# Patient Record
Sex: MALE | Race: AMERICAN INDIAN OR ALASKA NATIVE
[De-identification: names, ages, dates, MRNs, and addresses within clinical notes are randomized per-mention and may not be internally consistent; named-entity substitution may affect disease eponyms.]

---

## 2018-08-29 ENCOUNTER — HOSPITAL ENCOUNTER (EMERGENCY)
Dept: HOSPITAL 42 - ED | Age: 43
Discharge: HOME | End: 2018-08-29
Payer: COMMERCIAL

## 2018-08-29 VITALS — RESPIRATION RATE: 18 BRPM | TEMPERATURE: 98.2 F

## 2018-08-29 VITALS — SYSTOLIC BLOOD PRESSURE: 135 MMHG | HEART RATE: 67 BPM | DIASTOLIC BLOOD PRESSURE: 82 MMHG | OXYGEN SATURATION: 97 %

## 2018-08-29 VITALS — BODY MASS INDEX: 36.5 KG/M2

## 2018-08-29 DIAGNOSIS — R00.2: Primary | ICD-10-CM

## 2018-08-29 DIAGNOSIS — I48.91: ICD-10-CM

## 2018-08-29 LAB
ALBUMIN SERPL-MCNC: 4.6 G/DL (ref 3–4.8)
ALBUMIN/GLOB SERPL: 1.4 {RATIO} (ref 1.1–1.8)
ALT SERPL-CCNC: 68 U/L (ref 7–56)
APPEARANCE UR: (no result)
APTT BLD: 34.3 SECONDS (ref 25.1–36.5)
AST SERPL-CCNC: 44 U/L (ref 17–59)
BACTERIA #/AREA URNS HPF: (no result) /[HPF]
BASOPHILS # BLD AUTO: 0.02 K/MM3 (ref 0–2)
BASOPHILS NFR BLD: 0.3 % (ref 0–3)
BILIRUB UR-MCNC: NEGATIVE MG/DL
BUN SERPL-MCNC: 17 MG/DL (ref 7–21)
CALCIUM SERPL-MCNC: 9 MG/DL (ref 8.4–10.5)
CK MB SERPL-MCNC: 4 NG/ML (ref 0–3.6)
COLOR UR: YELLOW
EOSINOPHIL # BLD: 0.3 10*3/UL (ref 0–0.7)
EOSINOPHIL NFR BLD: 4.1 % (ref 1.5–5)
ERYTHROCYTE [DISTWIDTH] IN BLOOD BY AUTOMATED COUNT: 13.2 % (ref 11.5–14.5)
GFR NON-AFRICAN AMERICAN: > 60
GLUCOSE UR STRIP-MCNC: NEGATIVE MG/DL
GRANULOCYTES # BLD: 3.29 10*3/UL (ref 1.4–6.5)
GRANULOCYTES NFR BLD: 42 % (ref 50–68)
HGB BLD-MCNC: 13.9 G/DL (ref 14–18)
INR PPP: 1.03
LEUKOCYTE ESTERASE UR-ACNC: NEGATIVE LEU/UL
LYMPHOCYTES # BLD: 3.6 10*3/UL (ref 1.2–3.4)
LYMPHOCYTES NFR BLD AUTO: 45.9 % (ref 22–35)
MCH RBC QN AUTO: 26.9 PG (ref 25–35)
MCHC RBC AUTO-ENTMCNC: 32.9 G/DL (ref 31–37)
MCV RBC AUTO: 81.8 FL (ref 80–105)
MONOCYTES # BLD AUTO: 0.6 10*3/UL (ref 0.1–0.6)
MONOCYTES NFR BLD: 7.7 % (ref 1–6)
PH UR STRIP: 5.5 [PH] (ref 4.7–8)
PLATELET # BLD: 184 10^3/UL (ref 120–450)
PMV BLD AUTO: 9 FL (ref 7–11)
PROT UR STRIP-MCNC: 30 MG/DL
PROTHROMBIN TIME: 11.7 SECONDS (ref 9.4–12.5)
RBC # BLD AUTO: 5.17 10^6/UL (ref 3.5–6.1)
RBC # UR STRIP: NEGATIVE /UL
RBC #/AREA URNS HPF: NEGATIVE /HPF (ref 0–2)
SP GR UR STRIP: >= 1.03 (ref 1–1.03)
T4 SERPL-MCNC: 8.7 UG/DL (ref 5.5–11)
TROPONIN I SERPL-MCNC: < 0.01 NG/ML
UROBILINOGEN UR STRIP-ACNC: 0.2 E.U./DL
WBC # BLD AUTO: 7.8 10^3/UL (ref 4.5–11)
WBC #/AREA URNS HPF: NEGATIVE /HPF (ref 0–6)

## 2018-08-29 NOTE — CARD
--------------- APPROVED REPORT --------------





Date of service: 08/29/2018



EKG Measurement

Heart [id number]

[id number]

[id number]

[id number]

[id number]



<Conclusion>

Normal sinus rhythm with 1st degree AVB

Moderate voltage criteria for LVH, may be normal variant

J-point elevations

## 2018-08-29 NOTE — RAD
HISTORY:

palpitations  



COMPARISON:

None available. 



TECHNIQUE:

Chest, one view.



FINDINGS:

Examination limited by habitus.



LUNGS:

No focal consolidation.



9 mm nodular density within the left upper lobe. 



Please note that chest x-ray has limited sensitivity for the 

detection of pulmonary masses.



PLEURA:

No significant pleural effusion identified. No definite pneumothorax .



CARDIOVASCULAR:

Heart size appears top normal.



OSSEOUS STRUCTURES:

 Degenerative changes.



VISUALIZED UPPER ABDOMEN:

Unremarkable.



OTHER FINDINGS:

None.



IMPRESSION:

9 mm nodular density within the left upper lobe may reflect 

confluence of shadows however pulmonary nodule cannot be excluded.  

CT of the chest suggested for further evaluation if indicated. 



Study marked for PA review.

## 2018-08-29 NOTE — ED PDOC
Arrival/HPI





- General


Historian: Patient





- History of Present Illness


Time/Duration: Prior to Arrival


Symptom Onset: Gradual


Symptom Course: Unchanged


Activities at Onset: Light


Context: Home





- General


Time Seen by Provider: 08/29/18 02:59





- History of Present Illness


Narrative History of Present Illness (Text): 





08/29/18 03:03


42 year old male, with past medical history of A-fib, presents to the Emergency 

department complaining of palpitations since prior to arrival. Patient informs 

symptoms began suddenly and has been unchanged since onset prompting him to 

present to the Emergency department for medical evaluation. Patient informs 

similar symptoms in the past. Patient denies any associated chest pain or 

shortness of breath. Patient denies any fevers, chills, headache, dizziness, 

dyspnea on exertion, cough, abdominal pain, nausea, vomiting, diarrhea, back 

pain, neck pain, or any other complaints.


 (Elijah Sanon)





Past Medical History





- Provider Review


Nursing Documentation Reviewed: Yes





Family/Social History





- Physician Review


Nursing Documentation Reviewed: Yes


Family/Social History: No Known Family HX





Allergies/Home Meds


Allergies/Adverse Reactions: 


Allergies





No Known Allergies Allergy (Verified 08/29/18 02:58)


 








Home Medications: 


 Home Meds











 Medication  Instructions  Recorded  Confirmed


 


Aspirin [Aspirin EC] 325 mg PO DAILY 08/29/18 08/29/18


 


Metoprolol Succinate [Toprol Xl] 25 mg PO DAILY 08/29/18 08/29/18














Review of Systems





- Physician Review


All systems were reviewed & negative as marked: Yes





- Review of Systems


Constitutional: absent: Fevers


Respiratory: absent: SOB, Cough


Cardiovascular: Palpitations.  absent: Chest Pain


Gastrointestinal: absent: Abdominal Pain, Diarrhea, Nausea, Vomiting


Musculoskeletal: absent: Back Pain, Neck Pain


Neurological: absent: Headache, Dizziness





Physical Exam


Vital Signs Reviewed: Yes


Temperature: Afebrile


Blood Pressure: Normal


Pulse: Regular


Respiratory Rate: Normal


Appearance: Positive for: Well-Appearing, Non-Toxic, Comfortable


Pain Distress: None


Mental Status: Positive for: Alert and Oriented X 3





- Systems Exam


Head: Present: Atraumatic, Normocephalic


Pupils: Present: PERRL


Extroacular Muscles: Present: EOMI


Conjunctiva: Present: Normal


Mouth: Present: Moist Mucous Membranes


Neck: Present: Normal Range of Motion


Respiratory/Chest: Present: Clear to Auscultation, Good Air Exchange.  No: 

Respiratory Distress, Accessory Muscle Use


Cardiovascular: Present: Regular Rate and Rhythm, Normal S1, S2.  No: Murmurs


Abdomen: No: Tenderness, Distention, Peritoneal Signs


Back: Present: Normal Inspection


Upper Extremity: Present: Normal Inspection.  No: Cyanosis, Edema


Lower Extremity: Present: Normal Inspection.  No: Edema


Neurological: Present: GCS=15, CN II-XII Intact, Speech Normal


Skin: Present: Warm, Dry, Normal Color.  No: Rashes


Psychiatric: Present: Alert, Oriented x 3, Normal Insight, Normal Concentration


Vital Signs











  Temp Pulse Resp BP Pulse Ox


 


 08/29/18 04:22   67  18  135/82  97


 


 08/29/18 03:03  98.2 F  83  18  149/91 H  95














Medical Decision Making


ED Course and Treatment: 





08/29/18 03:04


Impression: 42 year old male presents to the Emergency department complaining 

of palpitations.





Plan:


-- EKG


-- Labs


-- Chest X-ray


-- Urinalysis


-- Reassess and disposition





Prior Visits:


Notes and results from previous visits were reviewed. 





Progress Notes:








 (Elijah Sanon)





- Lab Interpretations


Lab Results: 








 08/29/18 03:10 





 08/29/18 03:10 





 Lab Results





08/29/18 03:50: Urine Color Yellow, Urine Appearance Slight-cloudy, Urine pH 5.5

, Ur Specific Gravity >= 1.030, Urine Protein 30 H, Urine Glucose (UA) Negative

, Urine Ketones Trace H, Urine Blood Negative, Urine Nitrate Negative, Urine 

Bilirubin Negative, Urine Urobilinogen 0.2, Ur Leukocyte Esterase Negative, 

Urine RBC Negative, Urine WBC Negative, Ur Epithelial Cells 4 - 5, Urine 

Bacteria Mod, Urine Other Mucus


08/29/18 03:10: Thyroxine (T4) 8.7, TSH 3rd Generation 1.30


08/29/18 03:10: Sodium 141, Potassium 3.8, Chloride 100, Carbon Dioxide 30, 

Anion Gap 15, BUN 17, Creatinine 0.9, Est GFR (African Amer) > 60, Est GFR (Non-

Af Amer) > 60, Random Glucose 105, Calcium 9.0, Total Bilirubin 0.4, AST 44, 

ALT 68 H, Alkaline Phosphatase 59, Lactate Dehydrogenase 638, Total Creatine 

Kinase 793 H, CK-MB (CK-2) 4.0 H, CK-MB (CK-2) % Cancelled, Troponin I < 0.01, 

Total Protein 7.8, Albumin 4.6, Globulin 3.2, Albumin/Globulin Ratio 1.4


08/29/18 03:10: PT 11.7, INR 1.03, APTT 34.3


08/29/18 03:10: WBC 7.8, RBC 5.17, Hgb 13.9 L, Hct 42.3, MCV 81.8, MCH 26.9, 

MCHC 32.9, RDW 13.2, Plt Count 184, MPV 9.0, Gran % 42.0 L, Lymph % (Auto) 45.9 

H, Mono % (Auto) 7.7 H, Eos % (Auto) 4.1, Baso % (Auto) 0.3, Gran # 3.29, Lymph 

# (Auto) 3.6 H, Mono # (Auto) 0.6, Eos # (Auto) 0.3, Baso # (Auto) 0.02











- RAD Interpretation


Radiology Orders: 








08/29/18 03:04


CHEST PORTABLE [RAD] Stat 














- EKG Interpretation


EKG Interpretation (Text): 





08/29/18 06:15


nsr rate 80 nssts changes (Elijah Sanon)





- Scribe Statement


The provider has reviewed the documentation as recorded by the Scribe





- Scribe Statement


Rasheed Ramon.





All medical record entries made by the Scribe were at my direction and 

personally dictated by me. I have reviewed the chart and agree that the record 

accurately reflects my personal performance of the history, physical exam, 

medical decision making, and the department course for this patient. I have 

also personally directed, reviewed, and agree with the discharge instructions 

and disposition. (Elijah Sanon)





Disposition/Present on Arrival





- Present on Arrival


Any Indicators Present on Arrival: No





- Disposition


Have Diagnosis and Disposition been Completed?: Yes


Disposition Time: 04:35





- Disposition


Diagnosis: 


 Palpitations





Disposition: HOME/ ROUTINE


Condition: GOOD


Discharge Instructions (ExitCare):  Palpitations


Forms:  [facility] Interactive Connect (English)